# Patient Record
(demographics unavailable — no encounter records)

---

## 2025-07-21 NOTE — HISTORY OF PRESENT ILLNESS
[FreeTextEntry8] : [facility] the [facility] with c/o left knee pain.  Requesting orthopedic referral.  Past hx left knee "tear" repair.   States developed left knee pain a few weeks back that seems to be getting a little worse after sports related activity.  States knee seems to have "fluid surrounding the joint" and it is becoming more difficult to walk.

## 2025-07-21 NOTE — REVIEW OF SYSTEMS
[Joint Pain] : joint pain [Negative] : Neurological [FreeTextEntry9] : left knee- anterior discomfort from sports activity

## 2025-07-21 NOTE — PHYSICAL EXAM
[Normal] : no acute distress, well nourished, well developed and well-appearing [Limping On The Left] : limping on the left [de-identified] : Anterior left knee joint